# Patient Record
Sex: FEMALE | Race: WHITE | Employment: OTHER | URBAN - NONMETROPOLITAN AREA
[De-identification: names, ages, dates, MRNs, and addresses within clinical notes are randomized per-mention and may not be internally consistent; named-entity substitution may affect disease eponyms.]

---

## 2017-12-19 ENCOUNTER — OFFICE VISIT (OUTPATIENT)
Dept: OPTOMETRY | Age: 57
End: 2017-12-19

## 2017-12-19 DIAGNOSIS — H52.13 MYOPIA OF BOTH EYES WITH ASTIGMATISM AND PRESBYOPIA: Primary | ICD-10-CM

## 2017-12-19 DIAGNOSIS — H52.4 MYOPIA OF BOTH EYES WITH ASTIGMATISM AND PRESBYOPIA: Primary | ICD-10-CM

## 2017-12-19 DIAGNOSIS — H52.203 MYOPIA OF BOTH EYES WITH ASTIGMATISM AND PRESBYOPIA: Primary | ICD-10-CM

## 2017-12-19 PROCEDURE — 92014 COMPRE OPH EXAM EST PT 1/>: CPT | Performed by: OPTOMETRIST

## 2017-12-19 RX ORDER — BENOXINATE HCL/FLUORESCEIN SOD 0.4%-0.25%
1 DROPS OPHTHALMIC (EYE) ONCE
Status: COMPLETED | OUTPATIENT
Start: 2017-12-19 | End: 2017-12-19

## 2017-12-19 RX ADMIN — Medication 1 DROP: at 13:18

## 2017-12-19 ASSESSMENT — REFRACTION_MANIFEST
OD_CYLINDER: -0.75
OS_CYLINDER: -0.75
OS_SPHERE: -3.00
OD_AXIS: 095
OD_SPHERE: -4.00
OD_ADD: +2.25
OS_AXIS: 165
OS_ADD: +2.25

## 2017-12-19 ASSESSMENT — VISUAL ACUITY
OD_CC: 20/25 OU
OS_CC+: -2
CORRECTION_TYPE: GLASSES
METHOD: SNELLEN - LINEAR
OS_CC: 20/25
OD_CC+: -2

## 2017-12-19 ASSESSMENT — REFRACTION_WEARINGRX
OS_CYLINDER: -0.75
OS_AXIS: 168
OS_ADD: +2.25
OD_CYLINDER: -0.50
OD_AXIS: 092
OS_SPHERE: -2.75
OD_ADD: +2.25
OD_SPHERE: -4.00
SPECS_TYPE: BIFOCAL

## 2017-12-19 ASSESSMENT — TONOMETRY: IOP_METHOD: APPLANATION W FLURESS DROP

## 2017-12-19 ASSESSMENT — SLIT LAMP EXAM - LIDS
COMMENTS: NORMAL
COMMENTS: NORMAL

## 2017-12-19 NOTE — PROGRESS NOTES
America Gutierrez presents today for   Chief Complaint   Patient presents with    Vision Exam   .    HPI     Last Vision Exam: 12/01/2014 Aw  Last Ophthalmology Exam: n/a  Last Filled Glasses Rx: 12/01/2014  Insurance: No ins. Update: Glasses  Has not noticed any drastic changes in vision.                   Main Ophthalmology Exam     External Exam       Right Left    External Normal Normal          Slit Lamp Exam       Right Left    Lids/Lashes Normal Normal    Conjunctiva/Sclera White and quiet White and quiet    Cornea Clear Clear    Anterior Chamber Deep and quiet Deep and quiet    Iris Round and reactive Round and reactive    Lens Clear Clear    Vitreous Normal Normal          Fundus Exam       Right Left    Disc Normal Normal    C/D Ratio 0.25 0.3    Macula Normal Normal    Vessels Normal Normal                   Tonometry     Tonometry (Applanation w Fluress drop, 1:46 PM)     Palpation tonometry revealed soft and symmetrical interocular pressures within normal limits                   Visual Acuity (Snellen - Linear)       Right Left    Dist cc 20/20 -2 20/25 -2    Near cc 20/25 OU     Correction:  Glasses         Not recorded          Ophthalmology Exam     Wearing Rx       Sphere Cylinder Axis Add    Right -4.00 -0.50 092 +2.25    Left -2.75 -0.75 168 +2.25    Age:  4yrs    Type:  Bifocal                Manifest Refraction     Manifest Refraction       Sphere Cylinder [place] Dist [place] Add Near [place]    Right -4.00 -0.75 095 20/20 +2.25     Left -3.00 -0.75 165 20/20 +2.25 20/20          Manifest Refraction #2 (Auto)       Sphere Cylinder [place] Dist [place] Add Near [place]    Right -4.00 -0.50 095       Left -3.00 -0.50 162                  Final Rx       Sphere Cylinder Axis Add    Right -4.00 -0.75 095 +2.25    Left -3.00 -0.75 165 +2.25    Type:  PAL    Expiration Date:  12/20/2019            1. Myopia of both eyes with astigmatism and presbyopia           Patient Instructions   New glasses if desired      Return in about

## 2022-01-12 ENCOUNTER — OFFICE VISIT (OUTPATIENT)
Dept: OPTOMETRY | Age: 62
End: 2022-01-12
Payer: COMMERCIAL

## 2022-01-12 DIAGNOSIS — H52.203 MYOPIA OF BOTH EYES WITH ASTIGMATISM AND PRESBYOPIA: Primary | ICD-10-CM

## 2022-01-12 DIAGNOSIS — H52.4 MYOPIA OF BOTH EYES WITH ASTIGMATISM AND PRESBYOPIA: Primary | ICD-10-CM

## 2022-01-12 DIAGNOSIS — H52.13 MYOPIA OF BOTH EYES WITH ASTIGMATISM AND PRESBYOPIA: Primary | ICD-10-CM

## 2022-01-12 PROCEDURE — 92004 COMPRE OPH EXAM NEW PT 1/>: CPT | Performed by: OPTOMETRIST

## 2022-01-12 RX ORDER — LEVOTHYROXINE SODIUM 125 UG/1
TABLET ORAL
COMMUNITY
Start: 2022-01-05

## 2022-01-12 ASSESSMENT — REFRACTION_MANIFEST
OD_ADD: +2.50
OD_AXIS: 085
OD_CYLINDER: -0.75
OS_AXIS: 155
OD_SPHERE: -4.25
OS_SPHERE: -3.25
OS_ADD: +2.50
OS_CYLINDER: -0.50

## 2022-01-12 ASSESSMENT — VISUAL ACUITY
OD_CC: 20/20 OU
OS_CC: 20/25
METHOD: SNELLEN - LINEAR
CORRECTION_TYPE: GLASSES
OS_CC+: -1
OD_CC+: -2

## 2022-01-12 ASSESSMENT — KERATOMETRY
METHOD_AUTO_MANUAL: AUTOMATED
OS_AXISANGLE2_DEGREES: 175
OS_AXISANGLE_DEGREES: 085
OD_K2POWER_DIOPTERS: 44.50
OS_K1POWER_DIOPTERS: 44.00
OS_K2POWER_DIOPTERS: 45.00
OD_K1POWER_DIOPTERS: 44.50
OD_AXISANGLE_DEGREES: 090
OD_AXISANGLE2_DEGREES: 000

## 2022-01-12 ASSESSMENT — REFRACTION_WEARINGRX
OD_SPHERE: -4.00
OD_AXIS: 095
OS_CYLINDER: -0.75
OD_ADD: +2.25
OS_SPHERE: -3.00
OD_CYLINDER: -0.75
SPECS_TYPE: PAL
OS_AXIS: 165
OS_ADD: +2.25

## 2022-01-12 ASSESSMENT — ENCOUNTER SYMPTOMS
GASTROINTESTINAL NEGATIVE: 0
ALLERGIC/IMMUNOLOGIC NEGATIVE: 0
EYES NEGATIVE: 0
RESPIRATORY NEGATIVE: 0

## 2022-01-12 ASSESSMENT — SLIT LAMP EXAM - LIDS
COMMENTS: NORMAL
COMMENTS: NORMAL

## 2022-01-12 ASSESSMENT — TONOMETRY
OD_IOP_MMHG: 17
OS_IOP_MMHG: 18
IOP_METHOD: NON-CONTACT AIR PUFF

## 2022-01-12 NOTE — PROGRESS NOTES
 Attends Nondenominational Services: Not on file    Active Member of Clubs or Organizations: Not on file    Attends Club or Organization Meetings: Not on file    Marital Status: Not on file   Intimate Partner Violence:     Fear of Current or Ex-Partner: Not on file    Emotionally Abused: Not on file    Physically Abused: Not on file    Sexually Abused: Not on file   Housing Stability:     Unable to Pay for Housing in the Last Year: Not on file    Number of Places Lived in the Last Year: Not on file    Unstable Housing in the Last Year: Not on file     Past Medical History:   Diagnosis Date    Myopia with astigmatism     Thyroid disorder        ROS     Negative for: Constitutional, Gastrointestinal, Neurological, Skin, Genitourinary, Musculoskeletal, HENT, Endocrine, Cardiovascular, Eyes, Respiratory, Psychiatric, Allergic/Imm, Heme/Lymph          Main Ophthalmology Exam     External Exam       Right Left    External Normal Normal          Slit Lamp Exam       Right Left    Lids/Lashes Normal Normal    Conjunctiva/Sclera White and quiet White and quiet    Cornea Clear Clear    Anterior Chamber Deep and quiet Deep and quiet    Iris Round and reactive Round and reactive    Lens Trace Nuclear sclerosis Trace Nuclear sclerosis    Vitreous Normal Normal          Fundus Exam       Right Left    Disc Normal Normal    C/D Ratio 0.25 0.3    Macula Normal Normal    Vessels Normal Normal             <div id=\"MAIN_EXAM_REVIEWED\"></div>      Tonometry     Tonometry (Non-contact air puff, 3:15 PM)       Right Left    Pressure 17 18   IOP.2             17.7  CH:  10.3          10.2  WS: 4.5          7.9                 Not recorded       Not recorded         Visual Acuity (Snellen - Linear)       Right Left    Dist cc 20/20 -2 20/25 -1    Near cc 20/20 OU     Correction: Glasses          Pupils     Pupils       Pupils    Right PERRL    Left PERRL              Neuro/Psych     Neuro/Psych     Oriented x3:  Yes Mood/Affect: Normal              Keratometry     Keratometry (Automated)       K1 Axis K2 Axis    Right 44.50 000 44.50 090    Left 44.00 175 45.00 085                  Ophthalmology Exam     Wearing Rx       Sphere Cylinder Axis Add    Right -4.00 -0.75 095 +2.25    Left -3.00 -0.75 165 +2.25    Age: 6yrs    Type: PAL              Manifest Refraction     Manifest Refraction       Sphere Cylinder [place] Dist [place] Add Near [place]    Right -4.25 -0.75 085 20/20 +2.50     Left -3.25 -0.50 155 20/20 +2.50 20/20          Manifest Refraction #2 (Auto)       Sphere Cylinder [place] Dist [place] Add Near [place]    Right -4.50 -0.75 085       Left -3.50 -0.50 153                  Final Rx       Sphere Cylinder Axis Add    Right -4.25 -0.75 085 +2.50    Left -3.25 -0.50 155 +2.50    Type: PAL    Expiration Date: 1/13/2024            No orders of the defined types were placed in this encounter. IMPRESSION:  1. Myopia of both eyes with astigmatism and presbyopia        PLAN:    1.  New glasses recommended       Patient Instructions   New glasses recommended      Return in about 2 years (around 1/12/2024) for complete eye exam.